# Patient Record
(demographics unavailable — no encounter records)

---

## 2024-10-30 NOTE — ASSESSMENT
[FreeTextEntry1] : 75 yo female with obesity class 3 and comorbid conditions of TIA/mini stroke s/p carotid artery revascularization with stent, HLD, HTN, probable philippe:  -Due to her obesity class 3 and history of TIA/mini stroke requiring carotid artery revascularization with stent I recommend she start Wegovy/semaglutide for weight loss and to lesson the risk of future MACE (major adverse cardiac event) -Her PA is limited due to lymphedema/weight/deconditioning. She was encouraged to increase this as tolerated and to incorporate weight bearing exercise as tolerated. -She was told to ask her pcp about a formal workup for osteoporosis.  -She likely would benefit from a repeat sleep study. She did not like using cpap when she had the machine and got rid of it. Will revisit at her next appointment -follow up 4 weeks

## 2024-10-30 NOTE — HISTORY OF PRESENT ILLNESS
[HTN] : HTN [CASSANDRA] : CASSANDRA [Other: ___] : [unfilled] [] : No [FreeTextEntry1] : Anti-obesity medications: none Obesity medication side effects: none  Bariatric surgery history: none Obesity co-morbidities: htn, hld, tia/mini stroke s/p carotid artery revascularization, lymphedema, osteoarthritis  Co-morbidities improved or resolved:  highest adult weight: 225 lowest adult weight: 120 last weight:  current weight: 225  other: hypothyroid, she did have a dx of CASSANDRA in her 30s and she never used the machine (won't use it), diverticulosis, hemorroids  77 yo female with class 3 obesity, h/o TIA/mini stroke s/p carotid artery revascularization with stent, HTN, HLD, lymphedema (she is wearing her compression stockings, she is going to start getting massage therapy for this, won't use external compression device), OA, h/o elevated lft - thought to be drug induced (supplements or celebrex - now resolved).  -Of note she also has DDD of the spine and there is osteopenia on spine xrays, She reports no h/o  DEXA scan and takes no supplements/medicines.  -She also has history of solitary pulmonary nodule - ,unchanged since 2014  In college she was thin 120s. She's been gaining slowly over the years but this has become more pronounced of late.  She's attempted low calorie diets in the past.    She stopped taking Celebrex because of DILI, that did provide some relief.  She has a fierce headache, couldn't see out of right eye -> ER - had carotid stent.   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 remote h/o smoking, likes to have a glass of wine PA: OA and lymphedema limit her activity - walking/steps. In her youth she used to be very active.  She takes her 2 dogs for walks (small dog) family: father  of MI, mom  of lymphoma pshx; Left total knee, appy   diet:   breakfast: english muffin with butter, leftovers lunch: 2 slice of pizza, diet coke  dinner: 3 slices of meatloaf, mashed potatoes, butter, bread dessert: she avoids snack: none   no contraindications to GLP therapy: history of pancreatitis, personal or family history of medullary thyroid cancer or MEN2 no history of severe GI disease   Osteopenia on x ray  No h/o falls, she walks very slow but does not feel unsteady  vitamin d level 28

## 2024-10-30 NOTE — HISTORY OF PRESENT ILLNESS
[HTN] : HTN [CASSANDRA] : CASSANDRA [Other: ___] : [unfilled] [] : No [FreeTextEntry1] : Anti-obesity medications: none Obesity medication side effects: none  Bariatric surgery history: none Obesity co-morbidities: htn, hld, tia/mini stroke s/p carotid artery revascularization, lymphedema, osteoarthritis  Co-morbidities improved or resolved:  highest adult weight: 225 lowest adult weight: 120 last weight:  current weight: 225  other: hypothyroid, she did have a dx of CASSANDRA in her 30s and she never used the machine (won't use it), diverticulosis, hemorroids  75 yo female with class 3 obesity, h/o TIA/mini stroke s/p carotid artery revascularization with stent, HTN, HLD, lymphedema (she is wearing her compression stockings, she is going to start getting massage therapy for this, won't use external compression device), OA, h/o elevated lft - thought to be drug induced (supplements or celebrex - now resolved).  -Of note she also has DDD of the spine and there is osteopenia on spine xrays, She reports no h/o  DEXA scan and takes no supplements/medicines.  -She also has history of solitary pulmonary nodule - ,unchanged since 2014  In college she was thin 120s. She's been gaining slowly over the years but this has become more pronounced of late.  She's attempted low calorie diets in the past.    She stopped taking Celebrex because of DILI, that did provide some relief.  She has a fierce headache, couldn't see out of right eye -> ER - had carotid stent.   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 remote h/o smoking, likes to have a glass of wine PA: OA and lymphedema limit her activity - walking/steps. In her youth she used to be very active.  She takes her 2 dogs for walks (small dog) family: father  of MI, mom  of lymphoma pshx; Left total knee, appy   diet:   breakfast: english muffin with butter, leftovers lunch: 2 slice of pizza, diet coke  dinner: 3 slices of meatloaf, mashed potatoes, butter, bread dessert: she avoids snack: none   no contraindications to GLP therapy: history of pancreatitis, personal or family history of medullary thyroid cancer or MEN2 no history of severe GI disease   Osteopenia on x ray  No h/o falls, she walks very slow but does not feel unsteady  vitamin d level 28

## 2025-01-07 NOTE — HISTORY OF PRESENT ILLNESS
[FreeTextEntry1] : Pt is here for follow up of multiple medical problems including  HTN,hyperlipidemia  is recovering from URI  [facility]  and was given z pack and  has   resolved.  Never had fever and chills, had runny nose.   Had no loss  of appetite, no  fever, chills,  cough has been improving but not gone. It began around 12/25.

## 2025-01-24 NOTE — HISTORY OF PRESENT ILLNESS
[FreeTextEntry8] :  75 y/o F PMhx HLD, CAV, hypothyroid, HTN here for acute visit    Symptoms started before [place] could runny nose, went to [facility] med, had antibiotic, never improved saw PCP was put on prednisone, did help, but symptoms are back, last dose prednisone on 1/15/25  Main symptoms are cough and runny nose no fever cannot sleep due to cough

## 2025-01-24 NOTE — REVIEW OF SYSTEMS
[Nasal Discharge] : nasal discharge [Cough] : cough [FreeTextEntry2] :  Denies headcahe, problem with vision, cp, sob, abdominal pain, problem with bowel and bladder

## 2025-01-24 NOTE — PHYSICAL EXAM
[No Acute Distress] : no acute distress [Well Developed] : well developed [Normal Sclera/Conjunctiva] : normal sclera/conjunctiva [No Respiratory Distress] : no respiratory distress  [No Accessory Muscle Use] : no accessory muscle use [Regular Rhythm] : with a regular rhythm [Soft] : abdomen soft [No Joint Swelling] : no joint swelling [Grossly Normal Strength/Tone] : grossly normal strength/tone

## 2025-01-24 NOTE — HISTORY OF PRESENT ILLNESS
[FreeTextEntry8] :  77 y/o F PMhx HLD, CAV, hypothyroid, HTN here for acute visit    Symptoms started before [place] could runny nose, went to [facility] med, had antibiotic, never improved saw PCP was put on prednisone, did help, but symptoms are back, last dose prednisone on 1/15/25  Main symptoms are cough and runny nose no fever cannot sleep due to cough

## 2025-04-17 NOTE — PHYSICAL EXAM
[Soft] : abdomen soft [Non Tender] : non-tender [Normal] : soft, non-tender, non-distended, no masses palpated, no HSM and normal bowel sounds [No Joint Swelling] : no joint swelling [Coordination Grossly Intact] : coordination grossly intact [Deep Tendon Reflexes (DTR)] : deep tendon reflexes were 2+ and symmetric [de-identified] : lypmhedema, + staright leg R side [de-identified] : gait with a limp, senation intact

## 2025-04-17 NOTE — HISTORY OF PRESENT ILLNESS
[FreeTextEntry8] : 75 y/o F PMhx HLD, CAD, hypothyroid, HTN here for acute visit  Reports RLQ pain 2 weeks, worsening in nature reports excruciating 9/10 continuous, dull like ball on fires, aggravated walking and bending, better with sleep. Denies urinary, now issues with bowel movement no loss of appetite has no trailed any medication last colonoscopy with 2 years  When i examined patient pain is more RLQ back lumbar region, and   groin /hip pain + straight leg test, more like back spasm and radiculopathy Abdomen is soft non tender,

## 2025-05-06 NOTE — HISTORY OF PRESENT ILLNESS
[FreeTextEntry1] : Pt is here for follow up of multiple medical problems including  HTN, hyperlipidemia, TIA  and carotid stenosis

## 2025-05-14 NOTE — DATA REVIEWED
[FreeTextEntry1] : MR L Spine 10/2023 reviewed and interpreted by me: multilevel spondylosis  EXAM: [id number] - MR SPINE LUMBAR  - ORDERED BY: RODRIGO GERBER   PROCEDURE DATE:  10/04/2023    INTERPRETATION:  Exam Type: MR LUMBAR SPINE Exam Date and Time: 10/4/2023 8:12 AM Indication: Back pain. Difficulty walking. ADDITIONAL CLINICAL INFORMATION: Low back muscle strain S39.012A Comparison: Lumbar spine x-rays 7/21/2023.  TECHNIQUE: Multiplanar multisequence MRI of the lumbar spine was performed.  FINDINGS: OSSEOUS STRUCTURES: Levocurvature of the lumbar spine. Trace retrolisthesis of L1 on L2 and mild anterolisthesis of L3 on L4 and of L4 on L5. Vertebral body heights are preserved without compression deformity. Endplate fatty and edematous changes asymmetric to the left at L4-L5. There is osseous stress reaction spanning the left L4-L5 facet joint with extension into the left greater than right L4 and L5 pedicles. Background marrow signal is normal without infiltrative marrow process. No suspicious osseous lesions are identified.  SPINAL CANAL: The conus is normal in caliber and signal, terminating at the L1/L2 level. There is normal distribution of the nerve roots in the thecal sac.  SOFT TISSUES: There is mild symmetric fatty infiltration of the dorsal paraspinous musculature. The prevertebral and paraspinous soft tissues are otherwise unremarkable. Colonic diverticulosis.  DISC SPACES: There is multilevel degenerative disc disease characterized by loss of normal disc height and signal. Multilevel disc vacuum phenomenon present. Level-by-level analysis demonstrates the following:  T11-T12: No disc bulge or herniation. No spinal canal or neural foraminal narrowing.  T12-L1: No disc bulge or herniation. No spinal canal or neural foraminal narrowing.  L1-L2: Disc osteophyte complex asymmetric to the right. Bilateral facet joint arthrosis and ligamentum flavum thickening. Narrowing of the right subarticular zone. Moderate right foraminal narrowing. No left foraminal narrowing. Mild spinal canal narrowing.  L2-L3: Disc osteophyte complex. Bilateral facet joint arthrosis and ligamentum flavum thickening. Mild bilateral foraminal narrowing. No spinal canal narrowing.  L3-L4: Disc bulge. Bilateral facet joint arthrosis and ligamentum flavum thickening. No neural foraminal narrowing. Effacement of the ventral thecal sac without spinal canal narrowing.  L4-L5: Disc osteophyte complex. Bilateral facet joint arthrosis and ligamentum flavum thickening. Narrowing of the left subarticular zone. No right foraminal narrowing. Severe left foraminal narrowing. Effacement of the ventral thecal sac without spinal canal narrowing.  L5-S1: No disc bulge or herniation. Bilateral facet joint arthrosis and ligamentum flavum thickening. No spinal canal or neural foraminal narrowing.   IMPRESSION: Multilevel degenerative changes resulting in up to severe foraminal narrowing and mild spinal canal narrowing as described.  --- End of Report ---       JAZZY DING MD; Attending Radiologist This document has been electronically signed. Oct 10 2023 10:58AM

## 2025-05-14 NOTE — DATA REVIEWED
[FreeTextEntry1] : MR L Spine 10/2023 reviewed and interpreted by me: multilevel spondylosis  EXAM: [id number] - MR SPINE LUMBAR  - ORDERED BY: RODRIGO GERBER   PROCEDURE DATE:  10/04/2023    INTERPRETATION:  Exam Type: MR LUMBAR SPINE Exam Date and Time: 10/4/2023 8:12 AM Indication: Back pain. Difficulty walking. ADDITIONAL CLINICAL INFORMATION: Low back muscle strain S39.012A Comparison: Lumbar spine x-rays 7/21/2023.  TECHNIQUE: Multiplanar multisequence MRI of the lumbar spine was performed.  FINDINGS: OSSEOUS STRUCTURES: Levocurvature of the lumbar spine. Trace retrolisthesis of L1 on L2 and mild anterolisthesis of L3 on L4 and of L4 on L5. Vertebral body heights are preserved without compression deformity. Endplate fatty and edematous changes asymmetric to the left at L4-L5. There is osseous stress reaction spanning the left L4-L5 facet joint with extension into the left greater than right L4 and L5 pedicles. Background marrow signal is normal without infiltrative marrow process. No suspicious osseous lesions are identified.  SPINAL CANAL: The conus is normal in caliber and signal, terminating at the L1/L2 level. There is normal distribution of the nerve roots in the thecal sac.  SOFT TISSUES: There is mild symmetric fatty infiltration of the dorsal paraspinous musculature. The prevertebral and paraspinous soft tissues are otherwise unremarkable. Colonic diverticulosis.  DISC SPACES: There is multilevel degenerative disc disease characterized by loss of normal disc height and signal. Multilevel disc vacuum phenomenon present. Level-by-level analysis demonstrates the following:  T11-T12: No disc bulge or herniation. No spinal canal or neural foraminal narrowing.  T12-L1: No disc bulge or herniation. No spinal canal or neural foraminal narrowing.  L1-L2: Disc osteophyte complex asymmetric to the right. Bilateral facet joint arthrosis and ligamentum flavum thickening. Narrowing of the right subarticular zone. Moderate right foraminal narrowing. No left foraminal narrowing. Mild spinal canal narrowing.  L2-L3: Disc osteophyte complex. Bilateral facet joint arthrosis and ligamentum flavum thickening. Mild bilateral foraminal narrowing. No spinal canal narrowing.  L3-L4: Disc bulge. Bilateral facet joint arthrosis and ligamentum flavum thickening. No neural foraminal narrowing. Effacement of the ventral thecal sac without spinal canal narrowing.  L4-L5: Disc osteophyte complex. Bilateral facet joint arthrosis and ligamentum flavum thickening. Narrowing of the left subarticular zone. No right foraminal narrowing. Severe left foraminal narrowing. Effacement of the ventral thecal sac without spinal canal narrowing.  L5-S1: No disc bulge or herniation. Bilateral facet joint arthrosis and ligamentum flavum thickening. No spinal canal or neural foraminal narrowing.   IMPRESSION: Multilevel degenerative changes resulting in up to severe foraminal narrowing and mild spinal canal narrowing as described.  --- End of Report ---       JAZZY DING MD; Attending Radiologist This document has been electronically signed. Oct 10 2023 10:58AM

## 2025-05-14 NOTE — ASSESSMENT
[FreeTextEntry1] : Ms. KATY BETANCUR is a 76 year old female who presents with chronic low back pain, axial in nature, likely due to lumbar spondylosis. Denies any red flag signs. Will recommend: - Previous MRI L Spine reviewed - Given persistence of pain, will order MRI L Spine - Spoke about R/B/A of a MBB/RFA for which she would like to hold off - Start PT 2-3x/week for stretching, strengthening (especially of core muscles), ROM exercises, HEP and modalities PRN including myofascial release, moist heat   RTC in 5-6 weeks. Patient aware of red flag signs including any changes to their bowel/bladder control, groin numbness or new weakness. Patient knows to seek immediate attention by calling 911 or going to nearest ER if these symptoms appear.   This patient is being managed for a complex chronic pain that requires ongoing medical management. The nature of this condition requires a longitudinal relationship and monitoring over time for appropriate treatment.

## 2025-05-14 NOTE — HISTORY OF PRESENT ILLNESS
[FreeTextEntry1] : Ms. KATY BETANCUR is a 76 year old female who presents with low back pain.   Location: Across low back Onset: Chronic, for years, no inciting event, gradually worsening Provocation/Palliative: Worse with activity, better with rest Quality: Achy Radiation: None Severity: Moderate Timing: Not improving   Denies any associated numbness. Denies any associated leg weakness. Denies any loss of bowel/bladder control or any groin numbness. Previous medications trialed: Tries not to take medication Previous procedures relevant to complaint: None Conservative therapy tried?: No recent PT

## 2025-05-14 NOTE — PHYSICAL EXAM
[FreeTextEntry1] : PE: Constitutional: In NAD, calm and cooperative MSK (Back)  Inspection: no gross swelling identified  Palpation: Tenderness of the bilateral lower lumbar paraspinals  ROM: Pain at end lumbar extension>>flexion  Strength: 5/5 strength in bilateral lower extremities  Reflexes: 2+ Patella reflex bilaterally, 2+ Achilles reflex bilaterally, negative clonus bilaterally  Sensation: Intact to light touch in bilateral lower extremities Special tests: SLR: negative bilaterally Facet loading: positive bilaterally

## 2025-05-15 NOTE — DISCUSSION/SUMMARY
[Patient] : the patient [Risks] : risks [Benefits] : benefits [Alternatives] : alternatives [FreeTextEntry1] : Discussed with Dr. Rene over the phone  Reports of stuttering for few months now and with memory lapses; both are new to her per pt. Reports of feeling good otherwise. She has chronic shortness of breath when going up a staircase. Denies chest pain, shortness of breath at rest, palpitations, syncope or near syncope, orthopnea and PND. Compliant with medications. Elevated BP on this visit. She has chronic LLE lymphedema, follows Vascular and has lymphedema pump at home.  A/P:  1. LLE lymphedema- seen by vascular surgery- uses lymphedema pump at home. TTE last 7/2023 with LVEF of 60-65%, G1DD, mild LVH with no significant valvular abnormality. . Normal arterial and venous duplex.  2. CAD evaluation due to risk factors. Normal nuclear stress testing last 6/2023 3. hx CVA/s/p Carotid stenting- patent stent on US last 8/2024. On aspirin and statin. LDL 82 4. Recent onset of stuttering and memory lapses - on meds for hx of CVA. Patent stent on US last 8/2024. Denies palpitations. EKG in SR. Will refer to Neurology for further evaluation.  5. HTN - BP not at goal. Currently on lisinopril 10 mg QD, will increase to 20 mg QD. Low sodium diet. Office BP recheck in 1-2 weeks 6. f/u with Dr. Rene as scheduled or sooner if needed   Patient was advised to contact the office or seek emergency medical care for any new, worsening or concerning symptoms. Patient verbalized understanding and is in agreement with the above plan.  Arthur Freed, NP [EKG obtained to assist in diagnosis and management of assessed problem(s)] : EKG obtained to assist in diagnosis and management of assessed problem(s)

## 2025-05-15 NOTE — PHYSICAL EXAM
[Abnormal Gait] : abnormal gait [Normal PT B/L] : normal PT B/L [Normal DP B/L] : normal DP B/L [Edema ___] : edema [unfilled] [Moves all extremities] : moves all extremities [Normal Speech] : normal speech [Normal] : alert and oriented, normal memory [de-identified] : bluish discoloration of tips of toes b/l feet [de-identified] : limited gait.

## 2025-05-15 NOTE — HISTORY OF PRESENT ILLNESS
[FreeTextEntry1] : Patient with history of TIA, Hld and htn presenting to the office for further workup of bilateral lower extremity edema.  TIA- 2020- headache- right eye vision loss. CVA. [facility]- underwent carotid stenting.  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  Has had Bilateral LE edema and was referred by Dr. Aaron Gar.  Has not taken any medications for this and is resistant to take diuretics. Has seen vascular surgery (per her) and was told regarding no reflux/dvt. Denies cp, shortness of breath.  Has attempted compression socks but due to discomfort was unable to continue.   7/27/2023 Patient here for follow up and review test results. Reviewed normal echo and rubens NST results. Carotid US with patent stent. pt c/o b/l LE edema for months. Was seenby vascular but would like to be referred to another vascular doctor. She tried water pill and was urinating too much and does not want to take it. She also tried compression socks but unable to tolerate them and has not been wearing them. No CP or SOB. C/o pains in legs and trouble standing for long periods of time. Also notices tips of toes bluish color for months.   10/2023- Doing well. Has gained weight.   8/2024- Diagnosed with lymphedema.   5/9/2025 Reports of stuttering for few months now and with memory lapses; both are new to her per pt. Reports of feeling good otherwise. She has chronic shortness of breath when going up a staircase. Denies chest pain, shortness of breath at rest, palpitations, syncope or near syncope, orthopnea and PND. Compliant with medications. Elevated BP on this visit. She has chronic LLE lymphedema, follows Vascular and has lymphedema pump at home.

## 2025-05-15 NOTE — HISTORY OF PRESENT ILLNESS
[FreeTextEntry1] : Patient with history of TIA, Hld and htn presenting to the office for further workup of bilateral lower extremity edema.  TIA- 2020- headache- right eye vision loss. CVA. [facility]- underwent carotid stenting.  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  Has had Bilateral LE edema and was referred by Dr. Aaron Gar.  Has not taken any medications for this and is resistant to take diuretics. Has seen vascular surgery (per her) and was told regarding no reflux/dvt. Denies cp, shortness of breath.  Has attempted compression socks but due to discomfort was unable to continue.   7/27/2023 Patient here for follow up and review test results. Reviewed normal echo and rubens NST results. Carotid US with patent stent. pt c/o b/l LE edema for months. Was seenby vascular but would like to be referred to another vascular doctor. She tried water pill and was urinating too much and does not want to take it. She also tried compression socks but unable to tolerate them and has not been wearing them. No CP or SOB. C/o pains in legs and trouble standing for long periods of time. Also notices tips of toes bluish color for months.   10/2023- Doing well. Has gained weight.   8/2024- Diagnosed with lymphedema.   5/9/2025 Reports of stuttering for few months now and with memory lapses; both are new to her per pt. Reports of feeling good otherwise. She has chronic shortness of breath when going up a staircase. Denies chest pain, shortness of breath at rest, palpitations, syncope or near syncope, orthopnea and PND. Compliant with medications. Elevated BP on this visit. She has chronic LLE lymphedema, follows Vascular and has lymphedema pump at home.

## 2025-05-15 NOTE — CARDIOLOGY SUMMARY
[de-identified] : 5/9/2025: Sinus with no significant st-t wave abnormalities.  8/2024- Sinus with no significant st-t wave abnormalities.

## 2025-05-15 NOTE — PHYSICAL EXAM
[Abnormal Gait] : abnormal gait [Normal PT B/L] : normal PT B/L [Normal DP B/L] : normal DP B/L [Edema ___] : edema [unfilled] [Moves all extremities] : moves all extremities [Normal Speech] : normal speech [Normal] : alert and oriented, normal memory [de-identified] : bluish discoloration of tips of toes b/l feet [de-identified] : limited gait.

## 2025-05-15 NOTE — CARDIOLOGY SUMMARY
[de-identified] : 5/9/2025: Sinus with no significant st-t wave abnormalities.  8/2024- Sinus with no significant st-t wave abnormalities.

## 2025-05-15 NOTE — REVIEW OF SYSTEMS
[Dyspnea on exertion] : dyspnea during exertion [Chest Discomfort] : no chest discomfort [Palpitations] : no palpitations [Syncope] : no syncope

## 2025-07-29 NOTE — HISTORY OF PRESENT ILLNESS
[FreeTextEntry1] : Patient with history of TIA, Hld and htn presenting to the office for further workup of bilateral lower extremity edema.  TIA- 2020- headache- right eye vision loss. CVA. [facility]- underwent carotid stenting.  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  Has had Bilateral LE edema and was referred by Dr. Aaron Gar.  Has not taken any medications for this and is resistant to take diuretics. Has seen vascular surgery (per her) and was told regarding no reflux/dvt. Denies cp, shortness of breath.  Has attempted compression socks but due to discomfort was unable to continue.   7/27/2023 Patient here for follow up and review test results. Reviewed normal echo and rubens NST results. Carotid US with patent stent. pt c/o b/l LE edema for months. Was seenby vascular but would like to be referred to another vascular doctor. She tried water pill and was urinating too much and does not want to take it. She also tried compression socks but unable to tolerate them and has not been wearing them. No CP or SOB. C/o pains in legs and trouble standing for long periods of time. Also notices tips of toes bluish color for months.   10/2023- Doing well. Has gained weight.   8/2024- Diagnosed with lymphedema.   5/9/2025 Reports of stuttering for few months now and with memory lapses; both are new to her per pt. Reports of feeling good otherwise. She has chronic shortness of breath when going up a staircase. Denies chest pain, shortness of breath at rest, palpitations, syncope or near syncope, orthopnea and PND. Compliant with medications. Elevated BP on this visit. She has chronic LLE lymphedema, follows Vascular and has lymphedema pump at home.  7/29/25- She feels well overall. She continues to have BLLE edema, she wears compression socks. She also has rashes on the arms and legs. She still reports occasional stuttering, being followed by PCP.

## 2025-07-29 NOTE — CARDIOLOGY SUMMARY
[de-identified] : 5/9/2025: Sinus with no significant st-t wave abnormalities.  8/2024- Sinus with no significant st-t wave abnormalities.

## 2025-07-29 NOTE — DISCUSSION/SUMMARY
[EKG obtained to assist in diagnosis and management of assessed problem(s)] : EKG obtained to assist in diagnosis and management of assessed problem(s) [FreeTextEntry1] : 1. LLE lymphedema- seen by vascular surgery- uses lymphedema pump at home and wears compression socks. TTE last 7/2023 with LVEF of 60-65%, G1DD, mild LVH with no significant valvular abnormality. . Normal arterial and venous duplex. Will refer to dermatology for rash on arms and legs.  2. CAD evaluation due to risk factors- Normal nuclear stress testing last 6/2023.  3. hx CVA/s/p Carotid stenting- patent stent on US last 8/2024. continue aspirin and statin. LDL 82 in 5/2025. Plan to repeat US Duplex Carotid.  4. Recent onset of stuttering and memory lapses - on meds for hx of CVA. Patent stent on US last 8/2024. Denies palpitations. EKG in SR. Noncardiac in nature. She has not established with neurology yet. Follows with her PCP.  5. HTN - BP not at goal. continue lisinopril 10mg. Low sodium diet. Plan for repeat TTE to assess EF and for LVH.  6. Follow up in 1 year.

## 2025-07-29 NOTE — CARDIOLOGY SUMMARY
[de-identified] : 5/9/2025: Sinus with no significant st-t wave abnormalities.  8/2024- Sinus with no significant st-t wave abnormalities.

## 2025-07-29 NOTE — PHYSICAL EXAM
[Abnormal Gait] : abnormal gait [Normal PT B/L] : normal PT B/L [Normal DP B/L] : normal DP B/L [Edema ___] : edema [unfilled] [Moves all extremities] : moves all extremities [Normal Speech] : normal speech [Normal] : normal venous pressure, no carotid bruit [Normal Radial B/L] : normal radial B/L [de-identified] : limited gait.

## 2025-07-29 NOTE — HISTORY OF PRESENT ILLNESS
[FreeTextEntry1] : Patient with history of TIA, Hld and htn presenting to the office for further workup of bilateral lower extremity edema.  TIA- 2020- headache- right eye vision loss. CVA. [facility]- underwent carotid stenting.  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  Has had Bilateral LE edema and was referred by Dr. Aaron Gar.  Has not taken any medications for this and is resistant to take diuretics. Has seen vascular surgery (per her) and was told regarding no reflux/dvt. Denies cp, shortness of breath.  Has attempted compression socks but due to discomfort was unable to continue.   7/27/2023 Patient here for follow up and review test results. Reviewed normal echo and rubens NST results. Carotid US with patent stent. pt c/o b/l LE edema for months. Was seenby vascular but would like to be referred to another vascular doctor. She tried water pill and was urinating too much and does not want to take it. She also tried compression socks but unable to tolerate them and has not been wearing them. No CP or SOB. C/o pains in legs and trouble standing for long periods of time. Also notices tips of toes bluish color for months.   10/2023- Doing well. Has gained weight.   8/2024- Diagnosed with lymphedema.   5/9/2025 Reports of stuttering for few months now and with memory lapses; both are new to her per pt. Reports of feeling good otherwise. She has chronic shortness of breath when going up a staircase. Denies chest pain, shortness of breath at rest, palpitations, syncope or near syncope, orthopnea and PND. Compliant with medications. Elevated BP on this visit. She has chronic LLE lymphedema, follows Vascular and has lymphedema pump at home.  7/29/25- She feels well overall. She continues to have BLLE edema, she wears compression socks. She also has rashes on the arms and legs. She still reports occasional stuttering, being followed by PCP.

## 2025-07-29 NOTE — PHYSICAL EXAM
[Abnormal Gait] : abnormal gait [Normal PT B/L] : normal PT B/L [Normal DP B/L] : normal DP B/L [Edema ___] : edema [unfilled] [Moves all extremities] : moves all extremities [Normal Speech] : normal speech [Normal] : normal venous pressure, no carotid bruit [Normal Radial B/L] : normal radial B/L [de-identified] : limited gait.

## 2025-07-29 NOTE — END OF VISIT
[FreeTextEntry4] :  I, Zayda Pleitez, am scribing for and in the presence of  in the following sections HISTORY OF PRESENT ILLNESSS, PAST MEDICAL/FAMILY/SOCIAL HISTORY; REVIEW OF SYSTEMS; VITAL SIGNS; PHYSICAL EXAM; ASSESSMENT/PLAN     [FreeTextEntry3] : I, Maurilio Rene , personally performed the services described in this documentation. All medical record entries made by the scribe were at my direction and in my presence. I have reviewed the chart and agree that the record reflects my personal performance and is accurate and complete.